# Patient Record
Sex: MALE | Race: WHITE | NOT HISPANIC OR LATINO | URBAN - METROPOLITAN AREA
[De-identification: names, ages, dates, MRNs, and addresses within clinical notes are randomized per-mention and may not be internally consistent; named-entity substitution may affect disease eponyms.]

---

## 2021-08-11 ENCOUNTER — OFFICE VISIT (OUTPATIENT)
Dept: URBAN - METROPOLITAN AREA CLINIC 78 | Facility: CLINIC | Age: 41
End: 2021-08-11
Payer: COMMERCIAL

## 2021-08-11 ENCOUNTER — DASHBOARD ENCOUNTERS (OUTPATIENT)
Age: 41
End: 2021-08-11

## 2021-08-11 VITALS
TEMPERATURE: 97.4 F | SYSTOLIC BLOOD PRESSURE: 111 MMHG | WEIGHT: 160.6 LBS | HEIGHT: 72 IN | RESPIRATION RATE: 16 BRPM | BODY MASS INDEX: 21.75 KG/M2 | DIASTOLIC BLOOD PRESSURE: 72 MMHG | HEART RATE: 54 BPM

## 2021-08-11 DIAGNOSIS — K21.9 GASTROESOPHAGEAL REFLUX DISEASE, UNSPECIFIED WHETHER ESOPHAGITIS PRESENT: ICD-10-CM

## 2021-08-11 DIAGNOSIS — J02.9 SORE THROAT: ICD-10-CM

## 2021-08-11 DIAGNOSIS — R09.89 GLOBUS SENSATION: ICD-10-CM

## 2021-08-11 PROCEDURE — 99203 OFFICE O/P NEW LOW 30 MIN: CPT | Performed by: INTERNAL MEDICINE

## 2021-08-11 NOTE — HPI-TODAY'S VISIT:
Patient was referred by Dr. Gopal Cruz A copy of this document will be sent to the physician  Patient has hx of strep throat many years ago. He experienced sore throat last Monday (strep and COVID-19 was negative) . Sore throat persisted but it was not debilitating  He reports since Monday morning he experienced Globus sensation. Intermittent initially and since then it has been getting better   He admits to being fairly heavy drinker (4 drinks a night avg)   He does not take any OTC acid reducers  His reflux went away  6 months  He is concerned about Henderson's esophagus      Last EGD reviewed in 2017  He has a lot of reflux then  Denies Fhx of Esophageal cancer, Henderson's esophagus  Denies Fhx of colon cancer

## 2021-09-21 ENCOUNTER — OFFICE VISIT (OUTPATIENT)
Dept: URBAN - METROPOLITAN AREA SURGERY CENTER 15 | Facility: SURGERY CENTER | Age: 41
End: 2021-09-21
Payer: COMMERCIAL

## 2021-09-21 DIAGNOSIS — K22.8 COLUMNAR-LINED ESOPHAGUS: ICD-10-CM

## 2021-09-21 DIAGNOSIS — R13.13 CRICOPHARYNGEAL DYSPHAGIA: ICD-10-CM

## 2021-09-21 DIAGNOSIS — K29.60 ADENOPAPILLOMATOSIS GASTRICA: ICD-10-CM

## 2021-09-21 PROCEDURE — G8907 PT DOC NO EVENTS ON DISCHARG: HCPCS | Performed by: INTERNAL MEDICINE

## 2021-09-21 PROCEDURE — 43239 EGD BIOPSY SINGLE/MULTIPLE: CPT | Performed by: INTERNAL MEDICINE

## 2021-09-21 RX ORDER — PANTOPRAZOLE SODIUM 40 MG/1
1 TABLET TABLET, DELAYED RELEASE ORAL ONCE A DAY
Qty: 90 | Refills: 0 | OUTPATIENT
Start: 2021-09-21

## 2025-01-27 ENCOUNTER — OFFICE VISIT (OUTPATIENT)
Dept: URBAN - METROPOLITAN AREA CLINIC 78 | Facility: CLINIC | Age: 45
End: 2025-01-27
Payer: COMMERCIAL

## 2025-01-27 ENCOUNTER — OFFICE VISIT (OUTPATIENT)
Dept: URBAN - METROPOLITAN AREA CLINIC 78 | Facility: CLINIC | Age: 45
End: 2025-01-27

## 2025-01-27 VITALS
WEIGHT: 204 LBS | RESPIRATION RATE: 16 BRPM | DIASTOLIC BLOOD PRESSURE: 92 MMHG | BODY MASS INDEX: 27.63 KG/M2 | SYSTOLIC BLOOD PRESSURE: 145 MMHG | HEIGHT: 72 IN | TEMPERATURE: 98 F | HEART RATE: 59 BPM

## 2025-01-27 DIAGNOSIS — K62.5 BRBPR (BRIGHT RED BLOOD PER RECTUM): ICD-10-CM

## 2025-01-27 DIAGNOSIS — K44.9 DIAPHRAGMATIC HERNIA WITHOUT OBSTRUCTION AND WITHOUT GANGRENE: ICD-10-CM

## 2025-01-27 DIAGNOSIS — Z12.11 SCREENING FOR COLON CANCER: ICD-10-CM

## 2025-01-27 PROBLEM — 39839004: Status: ACTIVE | Noted: 2025-01-27

## 2025-01-27 PROBLEM — 74474003: Status: ACTIVE | Noted: 2025-01-27

## 2025-01-27 PROCEDURE — 99204 OFFICE O/P NEW MOD 45 MIN: CPT

## 2025-01-27 RX ORDER — PANTOPRAZOLE SODIUM 40 MG/1
1 TABLET TABLET, DELAYED RELEASE ORAL ONCE A DAY
Qty: 90 | Refills: 0 | Status: ON HOLD | COMMUNITY
Start: 2021-09-21

## 2025-01-27 NOTE — HPI-TODAY'S VISIT:
44-year-old male, new patient, presents for colon consultation  He deny N/V/GERD/dysphagia/abn weight loss/ abdominal pain/change in BH. He does have a BM QD, no mucus in the stool He does see small volume BRBPR when wiping, attributed to hemorrhoids, last seen 3-4 months ago, usually occurs after having a larger volume stool with a straining for production.  He is on an Klonopin and reports that his GERD has resolved with this.   He reports that he has a 4cm fat containing L sided diaphragmatic hernia, which he notices some discomfort in this region when he is exerting himself  wth working out or if he is breathing very hard.   Prior EGD: 9/21/2021 EGD with Dr. Ortiz: LA grade a reflux esophagitis with no bleeding, Z-line regular, 41 cm from incisors, erythematous mucosa in the stomach, erythematous duodenopathy.  PATH: Squamocolumnar junctional mucosa with chronic inflammation of the distal esophagus.  Mild chronic gastritis in the stomach antrum and body, negative for H. pylori.  Small bowel mucosa with no significant histopathology in the duodenum, negative for celiac disease or infectious microorganisms.    1/4/2017 EGD with Dr. Vines: Normal hypopharynx, esophagus, Z-line regular, 45 cm from incisors, erythematous mucosa in the antrum, widely patent pyloric channel, normal duodenum. PATH: Squamous mucosa with no significant histopathological changes in the esophagus, consistent with reactive gastropathy in the gastric body, regenerative changes consistent with a healing erosion and/or reactive chemical gastropathy/gastritis are noticed.  Negative for H. pylori, intestinal metaplasia, dysplasia, or malignancy.  Duodenal mucosa with no significant histopathological changes in the second portion of the duodenum, negative for celiac disease and parasites.  Prior Colonscopy: none  Family Hx: none Prior Abdominal surgeries: none CP/SOB: none Recent Cardiology or Pulmonology Eval: none, he had seen a cardio in the past but workup was negative. This was about 10 years ago.  Use of BT/NSAID/ GLP1 use: none

## 2025-02-20 ENCOUNTER — CLAIMS CREATED FROM THE CLAIM WINDOW (OUTPATIENT)
Dept: URBAN - METROPOLITAN AREA SURGERY CENTER 15 | Facility: SURGERY CENTER | Age: 45
End: 2025-02-20
Payer: COMMERCIAL

## 2025-02-20 DIAGNOSIS — Z12.11 COLON CANCER SCREENING: ICD-10-CM

## 2025-02-20 PROCEDURE — 00812 ANES LWR INTST SCR COLSC: CPT | Performed by: NURSE ANESTHETIST, CERTIFIED REGISTERED

## 2025-02-20 PROCEDURE — 0528F RCMND FLW-UP 10 YRS DOCD: CPT | Performed by: INTERNAL MEDICINE

## 2025-02-20 PROCEDURE — G0121 COLON CA SCRN NOT HI RSK IND: HCPCS | Performed by: INTERNAL MEDICINE
